# Patient Record
Sex: MALE | Race: WHITE | NOT HISPANIC OR LATINO | Employment: STUDENT | URBAN - METROPOLITAN AREA
[De-identification: names, ages, dates, MRNs, and addresses within clinical notes are randomized per-mention and may not be internally consistent; named-entity substitution may affect disease eponyms.]

---

## 2022-08-08 ENCOUNTER — OFFICE VISIT (OUTPATIENT)
Dept: INTERNAL MEDICINE | Facility: CLINIC | Age: 22
End: 2022-08-08

## 2022-08-08 VITALS
BODY MASS INDEX: 16.3 KG/M2 | WEIGHT: 138 LBS | HEART RATE: 88 BPM | DIASTOLIC BLOOD PRESSURE: 62 MMHG | SYSTOLIC BLOOD PRESSURE: 102 MMHG | RESPIRATION RATE: 16 BRPM | HEIGHT: 77 IN | TEMPERATURE: 97.3 F | OXYGEN SATURATION: 99 %

## 2022-08-08 DIAGNOSIS — H04.302 DACRYOCYSTITIS OF LEFT LACRIMAL SAC: ICD-10-CM

## 2022-08-08 DIAGNOSIS — Z00.00 ROUTINE GENERAL MEDICAL EXAMINATION AT A HEALTH CARE FACILITY: Primary | ICD-10-CM

## 2022-08-08 PROCEDURE — 99395 PREV VISIT EST AGE 18-39: CPT | Performed by: INTERNAL MEDICINE

## 2022-08-08 NOTE — PROGRESS NOTES
"Chief Complaint   Patient presents with   • Suspicious Skin Lesion       Subjective   Bhavik Ulloa is a 22 y.o. male here for   Chief Complaint   Patient presents with   • Suspicious Skin Lesion   .    History of Present Illness     The following portions of the patient's history were reviewed and updated as appropriate: allergies, current medications, past family history, past medical history, past social history, past surgical history, and problem list.  Here for the physical today.  Overall doing very well.  No specific concerns.  We talked about diet and exercise habits.  Discussed prevention recommendations.   Review of Systems   Constitutional: Negative.    HENT: Negative for congestion, drooling, facial swelling, hearing loss, mouth sores, rhinorrhea, sinus pressure, sore throat, swollen glands, tinnitus and trouble swallowing.    Eyes: Negative.    Respiratory: Negative.    Cardiovascular: Negative.    Gastrointestinal: Negative.    Endocrine: Negative.    Genitourinary: Negative for difficulty urinating, dysuria, flank pain, frequency, hematuria and urgency.   Musculoskeletal: Negative for arthralgias and myalgias.   Skin: Negative for color change, dry skin, rash and wound.   Neurological: Negative for dizziness, tremors, weakness, headache, memory problem and confusion.   Hematological: Negative for adenopathy. Does not bruise/bleed easily.   Psychiatric/Behavioral: Positive for stress. Negative for agitation, behavioral problems and depressed mood. The patient is not nervous/anxious.        Body mass index is 16.36 kg/m².   Vitals:    08/08/22 1143   BP: 102/62   BP Location: Left arm   Patient Position: Sitting   Cuff Size: Large Adult   Pulse: 88   Resp: 16   Temp: 97.3 °F (36.3 °C)   TempSrc: Temporal   SpO2: 99%   Weight: 62.6 kg (138 lb)   Height: 195.6 cm (77\")        Objective   Physical Exam  Vitals and nursing note reviewed.   Constitutional:       Appearance: Normal appearance.   HENT:      Head: " Normocephalic and atraumatic.   Cardiovascular:      Rate and Rhythm: Normal rate and regular rhythm.   Pulmonary:      Effort: Pulmonary effort is normal.      Breath sounds: Normal breath sounds.   Abdominal:      General: Abdomen is flat.      Palpations: Abdomen is soft.   Musculoskeletal:         General: No swelling or deformity.      Cervical back: Neck supple.      Right lower leg: No edema.      Left lower leg: No edema.   Skin:     General: Skin is warm.      Capillary Refill: Capillary refill takes less than 2 seconds.      Findings: No rash.   Neurological:      General: No focal deficit present.      Mental Status: He is alert and oriented to person, place, and time.         No results found for: CBCDIF, CMP, LIPIDINTERP, HGBA1C, TSH, HDWY48HS, PSA, TESTOSTERONE     Health Maintenance   Topic Date Due   • INFLUENZA VACCINE  10/01/2022   • TDAP/TD VACCINES (2 - Td or Tdap) 05/29/2027        Assessment & Plan   Problems Addressed this Visit    None     Visit Diagnoses     Routine general medical examination at a health care facility    -  Primary    Dacryocystitis of left lacrimal sac          Diagnoses       Codes Comments    Routine general medical examination at a health care facility    -  Primary ICD-10-CM: Z00.00  ICD-9-CM: V70.0     Dacryocystitis of left lacrimal sac     ICD-10-CM: H04.302  ICD-9-CM: 375.30           No orders of the defined types were placed in this encounter.     He had previously some swelling of his nasolacrimal duct on the left it sounds like.  It is doing better now and there was a concomitant sinusitis with that it sounds like.  No other obvious findings.  Told him to continue to monitor and follow-up if any changes.  We also talked about some stress management concerns.  Exercise and appropriate diet.  Relaxation techniques like meditation always helpful.  Try to minimize screen time technology.  He did not feel like he needed any medication just wanted to discuss some  additional nonmedicinal treatment options.  Did not feel like needed STI screening at this time but the school does test if needed who will check in with them.  Depression screen reviewed with patient and negative.  Advised behavioral modifications including dietary changes and increased exercise with goal of healthy weight and lifestyle.   Will make follow up plans based on lab results as above.          No follow-ups on file.